# Patient Record
Sex: FEMALE | Race: BLACK OR AFRICAN AMERICAN | NOT HISPANIC OR LATINO | Employment: STUDENT | URBAN - METROPOLITAN AREA
[De-identification: names, ages, dates, MRNs, and addresses within clinical notes are randomized per-mention and may not be internally consistent; named-entity substitution may affect disease eponyms.]

---
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
## 2022-10-21 NOTE — PROGRESS NOTES
[facility] Note    Chief Complaint      Chief Complaint   Patient presents with    Annual Exam       History of Present Illness      Marly Peng is a 22 y.o. female who presents today for       Problem List Items Addressed This Visit    None  Visit Diagnoses       Routine medical exam    -  Primary    Relevant Orders    CBC Auto Differential    Comprehensive Metabolic Panel    Hemoglobin A1C    Lipid Panel    T4, Free    TSH        Patient is a new patient to me.  She wants to establish primary care. She reports feeling well today. She denies any SOB, chest pain, N/V, unintentional weight loss, loss of appetite, fatigue, diarrhea, constipation. She is active daily and remains independent with ADL's. 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 She has not been sexually active yet therefore incole not need a PAP at this time. I have discussed importance of starting PAP/vaginal exams when she becomes sexually active. She verbalizes understanding.  Review of Systems   Constitutional: Negative.    HENT: Negative.     Eyes: Negative.    Respiratory: Negative.     Cardiovascular: Negative.    Gastrointestinal: Negative.    Genitourinary: Negative.    Musculoskeletal: Negative.    Skin: Negative.    Neurological: Negative.    Endo/Heme/Allergies: Negative.    Psychiatric/Behavioral: Negative.     All 12 systems otherwise negative.     Past Medical History:  History reviewed. No pertinent past medical history.    Past Surgical History:  History reviewed. No pertinent surgical history.    Family History:  family history includes Diabetes in her maternal grandmother; Diabetes type II in her mother; Hypertension in her maternal grandmother and mother; No Known Problems in her father.   Family history was reviewed with patient.     Social History:  Social History     Socioeconomic History    Marital status: Single   Tobacco Use    Smoking status: Never    Smokeless tobacco: Never  "  Substance and Sexual Activity    Alcohol use: No   Social History Narrative    Lives with mom and sister (21 years old).             Medications:  No outpatient encounter medications on file as of 11/3/2022.     No facility-administered encounter medications on file as of 11/3/2022.       Allergies:  Review of patient's allergies indicates:   Allergen Reactions    Amoxil [amoxicillin] Hives    Chocolate flavor Hives       Health Maintenance:  Health Maintenance   Topic Date Due    Hepatitis C Screening  Never done    Lipid Panel  Never done    Varicella Vaccines (2 of 2 - 2-dose childhood series) 12/30/2005    HPV Vaccines (1 - 2-dose series) Never done    Pap Smear  Never done    TETANUS VACCINE  02/18/2023    DTaP/Tdap/Td Vaccines (7 - Td or Tdap) 02/18/2023    Hib Vaccines  Completed    Hepatitis B Vaccines  Completed    IPV Vaccines  Completed    Hepatitis A Vaccines  Completed    MMR Vaccines  Completed    Meningococcal Vaccine  Completed     Health Maintenance Topics with due status: Not Due       Topic Last Completion Date    TETANUS VACCINE 02/18/2013    DTaP/Tdap/Td Vaccines 02/18/2013       Physical Exam      Vital Signs  Temp: 97.9 °F (36.6 °C)  Temp src: Oral  Pulse: 100  SpO2: 99 %  BP: 130/76  BP Location: Right arm  Patient Position: Sitting  Pain Score: 0-No pain  Height and Weight  Height: 5' 6" (167.6 cm)  Weight: 78.6 kg (173 lb 4.5 oz)  BSA (Calculated - sq m): 1.91 sq meters  BMI (Calculated): 28  Weight in (lb) to have BMI = 25: 154.6]    Physical Exam  Vitals reviewed.   Constitutional:       Appearance: Normal appearance. She is normal weight.   HENT:      Head: Normocephalic and atraumatic.      Right Ear: Tympanic membrane, ear canal and external ear normal.      Left Ear: Tympanic membrane, ear canal and external ear normal.      Nose: Nose normal.      Mouth/Throat:      Mouth: Mucous membranes are moist.      Pharynx: Oropharynx is clear.   Eyes:      Extraocular Movements: Extraocular " movements intact.      Conjunctiva/sclera: Conjunctivae normal.      Pupils: Pupils are equal, round, and reactive to light.   Cardiovascular:      Rate and Rhythm: Normal rate and regular rhythm.      Pulses: Normal pulses.      Heart sounds: Normal heart sounds.   Pulmonary:      Effort: Pulmonary effort is normal.      Breath sounds: Normal breath sounds.   Abdominal:      General: Abdomen is flat. Bowel sounds are normal.      Palpations: Abdomen is soft.   Musculoskeletal:         General: Normal range of motion.      Cervical back: Normal range of motion and neck supple.   Skin:     General: Skin is warm and dry.      Capillary Refill: Capillary refill takes less than 2 seconds.   Neurological:      General: No focal deficit present.      Mental Status: She is alert and oriented to person, place, and time. Mental status is at baseline.   Psychiatric:         Mood and Affect: Mood normal.         Behavior: Behavior normal.         Thought Content: Thought content normal.         Judgment: Judgment normal.        Laboratory:  CBC:      CMP:        Invalid input(s): CREATININ  URINALYSIS:       LIPIDS:      TSH:      A1C:        Radiology:        Assessment/Plan     Marly Peng is a 22 y.o.female with:    Routine medical exam  -     CBC Auto Differential; Future; Expected date: 11/03/2022  -     Comprehensive Metabolic Panel; Future; Expected date: 11/03/2022  -     Hemoglobin A1C; Future; Expected date: 11/03/2022  -     Lipid Panel; Future; Expected date: 11/03/2022  -     T4, Free; Future; Expected date: 11/03/2022  -     TSH; Future; Expected date: 11/03/2022      As above, continue current medications and maintain follow up with specialists.  Return to clinic as needed.    I spent 20 minutes on the day of this encounter for preparing, evaluating, examining, treating, and discussing plan of care with this patient.  Greater than 50% of this time was spent face to face with patient.  All questions were  answered to patient's satisfaction.           Karen L Spencer, NP-C [facility]

## 2022-11-03 ENCOUNTER — OFFICE VISIT (OUTPATIENT)
Dept: PRIMARY CARE CLINIC | Facility: CLINIC | Age: 22
End: 2022-11-03
Payer: COMMERCIAL

## 2022-11-03 VITALS
HEART RATE: 100 BPM | DIASTOLIC BLOOD PRESSURE: 76 MMHG | SYSTOLIC BLOOD PRESSURE: 130 MMHG | OXYGEN SATURATION: 99 % | BODY MASS INDEX: 27.85 KG/M2 | HEIGHT: 66 IN | WEIGHT: 173.31 LBS | TEMPERATURE: 98 F

## 2022-11-03 DIAGNOSIS — Z00.00 ROUTINE MEDICAL EXAM: Primary | ICD-10-CM

## 2022-11-03 PROCEDURE — 1159F MED LIST DOCD IN RCRD: CPT | Mod: CPTII,S$GLB,, | Performed by: NURSE PRACTITIONER

## 2022-11-03 PROCEDURE — 3078F PR MOST RECENT DIASTOLIC BLOOD PRESSURE < 80 MM HG: ICD-10-PCS | Mod: CPTII,S$GLB,, | Performed by: NURSE PRACTITIONER

## 2022-11-03 PROCEDURE — 99999 PR PBB SHADOW E&M-NEW PATIENT-LVL IV: CPT | Mod: PBBFAC,,, | Performed by: NURSE PRACTITIONER

## 2022-11-03 PROCEDURE — 1159F PR MEDICATION LIST DOCUMENTED IN MEDICAL RECORD: ICD-10-PCS | Mod: CPTII,S$GLB,, | Performed by: NURSE PRACTITIONER

## 2022-11-03 PROCEDURE — 1160F PR REVIEW ALL MEDS BY PRESCRIBER/CLIN PHARMACIST DOCUMENTED: ICD-10-PCS | Mod: CPTII,S$GLB,, | Performed by: NURSE PRACTITIONER

## 2022-11-03 PROCEDURE — 3078F DIAST BP <80 MM HG: CPT | Mod: CPTII,S$GLB,, | Performed by: NURSE PRACTITIONER

## 2022-11-03 PROCEDURE — 1160F RVW MEDS BY RX/DR IN RCRD: CPT | Mod: CPTII,S$GLB,, | Performed by: NURSE PRACTITIONER

## 2022-11-03 PROCEDURE — 3075F SYST BP GE 130 - 139MM HG: CPT | Mod: CPTII,S$GLB,, | Performed by: NURSE PRACTITIONER

## 2022-11-03 PROCEDURE — 3075F PR MOST RECENT SYSTOLIC BLOOD PRESS GE 130-139MM HG: ICD-10-PCS | Mod: CPTII,S$GLB,, | Performed by: NURSE PRACTITIONER

## 2022-11-03 PROCEDURE — 3008F PR BODY MASS INDEX (BMI) DOCUMENTED: ICD-10-PCS | Mod: CPTII,S$GLB,, | Performed by: NURSE PRACTITIONER

## 2022-11-03 PROCEDURE — 99385 PR PREVENTIVE VISIT,NEW,18-39: ICD-10-PCS | Mod: S$GLB,,, | Performed by: NURSE PRACTITIONER

## 2022-11-03 PROCEDURE — 99999 PR PBB SHADOW E&M-NEW PATIENT-LVL IV: ICD-10-PCS | Mod: PBBFAC,,, | Performed by: NURSE PRACTITIONER

## 2022-11-03 PROCEDURE — 3008F BODY MASS INDEX DOCD: CPT | Mod: CPTII,S$GLB,, | Performed by: NURSE PRACTITIONER

## 2022-11-03 PROCEDURE — 99385 PREV VISIT NEW AGE 18-39: CPT | Mod: S$GLB,,, | Performed by: NURSE PRACTITIONER

## 2022-11-08 ENCOUNTER — LAB VISIT (OUTPATIENT)
Dept: LAB | Facility: HOSPITAL | Age: 22
End: 2022-11-08
Attending: NURSE PRACTITIONER
Payer: COMMERCIAL

## 2022-11-08 DIAGNOSIS — Z00.00 ROUTINE MEDICAL EXAM: ICD-10-CM

## 2022-11-08 DIAGNOSIS — D64.9 ANEMIA, UNSPECIFIED TYPE: Primary | ICD-10-CM

## 2022-11-08 LAB
ALBUMIN SERPL BCP-MCNC: 4.2 G/DL (ref 3.5–5.2)
ALP SERPL-CCNC: 40 U/L (ref 55–135)
ALT SERPL W/O P-5'-P-CCNC: 13 U/L (ref 10–44)
ANION GAP SERPL CALC-SCNC: 12 MMOL/L (ref 8–16)
AST SERPL-CCNC: 17 U/L (ref 10–40)
BASOPHILS # BLD AUTO: 0.05 K/UL (ref 0–0.2)
BASOPHILS NFR BLD: 0.9 % (ref 0–1.9)
BILIRUB SERPL-MCNC: 0.4 MG/DL (ref 0.1–1)
BUN SERPL-MCNC: 8 MG/DL (ref 6–20)
CALCIUM SERPL-MCNC: 9.7 MG/DL (ref 8.7–10.5)
CHLORIDE SERPL-SCNC: 105 MMOL/L (ref 95–110)
CHOLEST SERPL-MCNC: 170 MG/DL (ref 120–199)
CHOLEST/HDLC SERPL: 2.4 {RATIO} (ref 2–5)
CO2 SERPL-SCNC: 23 MMOL/L (ref 23–29)
CREAT SERPL-MCNC: 0.9 MG/DL (ref 0.5–1.4)
DIFFERENTIAL METHOD: ABNORMAL
EOSINOPHIL # BLD AUTO: 0.1 K/UL (ref 0–0.5)
EOSINOPHIL NFR BLD: 2 % (ref 0–8)
ERYTHROCYTE [DISTWIDTH] IN BLOOD BY AUTOMATED COUNT: 14 % (ref 11.5–14.5)
EST. GFR  (NO RACE VARIABLE): >60 ML/MIN/1.73 M^2
ESTIMATED AVG GLUCOSE: 108 MG/DL (ref 68–131)
GLUCOSE SERPL-MCNC: 84 MG/DL (ref 70–110)
HBA1C MFR BLD: 5.4 % (ref 4–5.6)
HCT VFR BLD AUTO: 35.2 % (ref 37–48.5)
HDLC SERPL-MCNC: 71 MG/DL (ref 40–75)
HDLC SERPL: 41.8 % (ref 20–50)
HGB BLD-MCNC: 10.3 G/DL (ref 12–16)
IMM GRANULOCYTES # BLD AUTO: 0.01 K/UL (ref 0–0.04)
IMM GRANULOCYTES NFR BLD AUTO: 0.2 % (ref 0–0.5)
LDLC SERPL CALC-MCNC: 88.4 MG/DL (ref 63–159)
LYMPHOCYTES # BLD AUTO: 2 K/UL (ref 1–4.8)
LYMPHOCYTES NFR BLD: 35.8 % (ref 18–48)
MCH RBC QN AUTO: 24.2 PG (ref 27–31)
MCHC RBC AUTO-ENTMCNC: 29.3 G/DL (ref 32–36)
MCV RBC AUTO: 83 FL (ref 82–98)
MONOCYTES # BLD AUTO: 0.5 K/UL (ref 0.3–1)
MONOCYTES NFR BLD: 9.6 % (ref 4–15)
NEUTROPHILS # BLD AUTO: 2.8 K/UL (ref 1.8–7.7)
NEUTROPHILS NFR BLD: 51.5 % (ref 38–73)
NONHDLC SERPL-MCNC: 99 MG/DL
NRBC BLD-RTO: 0 /100 WBC
PLATELET # BLD AUTO: 254 K/UL (ref 150–450)
PMV BLD AUTO: 12.3 FL (ref 9.2–12.9)
POTASSIUM SERPL-SCNC: 3.6 MMOL/L (ref 3.5–5.1)
PROT SERPL-MCNC: 7.4 G/DL (ref 6–8.4)
RBC # BLD AUTO: 4.26 M/UL (ref 4–5.4)
SODIUM SERPL-SCNC: 140 MMOL/L (ref 136–145)
T4 FREE SERPL-MCNC: 0.94 NG/DL (ref 0.71–1.51)
TRIGL SERPL-MCNC: 53 MG/DL (ref 30–150)
TSH SERPL DL<=0.005 MIU/L-ACNC: 1.75 UIU/ML (ref 0.4–4)
WBC # BLD AUTO: 5.51 K/UL (ref 3.9–12.7)

## 2022-11-08 PROCEDURE — 80061 LIPID PANEL: CPT | Performed by: NURSE PRACTITIONER

## 2022-11-08 PROCEDURE — 84439 ASSAY OF FREE THYROXINE: CPT | Performed by: NURSE PRACTITIONER

## 2022-11-08 PROCEDURE — 84443 ASSAY THYROID STIM HORMONE: CPT | Performed by: NURSE PRACTITIONER

## 2022-11-08 PROCEDURE — 80053 COMPREHEN METABOLIC PANEL: CPT | Performed by: NURSE PRACTITIONER

## 2022-11-08 PROCEDURE — 83036 HEMOGLOBIN GLYCOSYLATED A1C: CPT | Performed by: NURSE PRACTITIONER

## 2022-11-08 PROCEDURE — 85025 COMPLETE CBC W/AUTO DIFF WBC: CPT | Performed by: NURSE PRACTITIONER

## 2022-11-08 PROCEDURE — 36415 COLL VENOUS BLD VENIPUNCTURE: CPT | Mod: PN | Performed by: NURSE PRACTITIONER

## 2024-03-18 ENCOUNTER — TELEPHONE (OUTPATIENT)
Dept: PRIMARY CARE CLINIC | Facility: CLINIC | Age: 24
End: 2024-03-18
Payer: COMMERCIAL

## 2024-03-18 NOTE — TELEPHONE ENCOUNTER
----- Message from Miryam Uriarte sent at 3/18/2024  8:38 AM CDT -----  Contact: [phone]  1MEDICALADVICE     Patient is calling for Medical Advice regarding: pt needs to reschedule today's appt couldn't help p[ pt due to visit type please advise      How long has patient had these symptoms:    Pharmacy name and phone#:    Would like response via [employer]: call back     Comments:

## 2024-03-18 NOTE — TELEPHONE ENCOUNTER
Spoke with patient and she wanted to reschedule for appointment for April 3 not today. Today's appointment was canceled.       ----- Message from Miryam Uriarte sent at 3/18/2024  8:38 AM CDT -----  Contact: [phone]  1MEDICALADVICE     Patient is calling for Medical Advice regarding: pt needs to reschedule today's appt couldn't help p[ pt due to visit type please advise      How long has patient had these symptoms:    Pharmacy name and phone#:    Would like response via [employer]: call back     Comments:

## 2024-03-20 ENCOUNTER — PATIENT OUTREACH (OUTPATIENT)
Dept: ADMINISTRATIVE | Facility: HOSPITAL | Age: 24
End: 2024-03-20
Payer: COMMERCIAL

## 2024-03-20 NOTE — PROGRESS NOTES
Health Maintenance Due   Topic Date Due    Hepatitis C Screening  Never done    Varicella Vaccines (2 of 2 - 2-dose childhood series) 12/30/2005    HPV Vaccines (1 - 2-dose series) Never done    HIV Screening  Never done    Pap Smear  Never done    TETANUS VACCINE  02/18/2023    DTaP/Tdap/Td Vaccines (7 - Td or Tdap) 02/18/2023    Influenza Vaccine (1) Never done    COVID-19 Vaccine (2 - 2023-24 season) 09/01/2023        Chart review done.   HM updated.   Immunizations reviewed & updated.   Care Everywhere updated.  [facility] reviewed

## 2024-04-03 NOTE — PROGRESS NOTES
[facility] Note    Chief Complaint      Chief Complaint   Patient presents with    Annual Exam       History of Present Illness      Marly Peng is a 23 y.o. female who presents today for   Chief Complaint   Patient presents with    Annual Exam         Patient is known to me.  She presents to clinic today for her routine medical exam.  She is active daily.  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  Blood pressure today is 142/60.  I have discussed starting medication of hydrochlorothiazide at 12.5 mg p.o. I have advised patient to keep a blood pressure diary with readings done in the a.m. and the p.m. and send readings to me after 1 week in 2 weeks through patient portal.  Patient verbalizes understanding.  There are no complaints today.  She is feeling well overall.           Review of Systems   All 12 systems otherwise negative.       Family History:  family history includes Diabetes in her maternal grandmother; Diabetes type II in her mother; Hypertension in her maternal grandmother and mother; No Known Problems in her brother, father, maternal grandfather, paternal grandfather, paternal grandmother, and sister.   Family history was reviewed with patient.     Medications:  Outpatient Encounter Medications as of 4/5/2024   Medication Sig Dispense Refill    hydroCHLOROthiazide (HYDRODIURIL) 12.5 MG Tab Take 1 tablet (12.5 mg total) by mouth once daily. 30 tablet 11     No facility-administered encounter medications on file as of 4/5/2024.       Allergies:  Review of patient's allergies indicates:   Allergen Reactions    Amoxil [amoxicillin] Hives    Chocolate flavor Hives       Health Maintenance:  Health Maintenance   Topic Date Due    Hepatitis C Screening  Never done    Pap Smear  Never done    Varicella Vaccines (2 of 2 - 2-dose childhood series) 04/05/2024 (Originally 12/30/2005)    TETANUS VACCINE  04/05/2025 (Originally  "2/18/2023)    HPV Vaccines (1 - 2-dose series) 04/05/2025 (Originally 5/30/2012)    Hib Vaccines  Completed    Hepatitis B Vaccines  Completed    IPV Vaccines  Completed    Lipid Panel  Completed    Hepatitis A Vaccines  Completed    MMR Vaccines  Completed    Meningococcal Vaccine  Completed    DTaP/Tdap/Td Vaccines  Discontinued     The patient has no Health Maintenance topics of status Not Due    Physical Exam      Vital Signs  Temp: 99.1 °F (37.3 °C)  Temp Source: Oral  Pulse: 106  SpO2: 99 %  BP: (!) 142/60  BP Location: Right arm  Patient Position: Sitting  Pain Score: 0-No pain  Height and Weight  Height: 5' 6" (167.6 cm)  Weight: 94.7 kg (208 lb 12.4 oz)  BSA (Calculated - sq m): 2.1 sq meters  BMI (Calculated): 33.7  Weight in (lb) to have BMI = 25: 154.6]    Physical Exam  Vitals reviewed.   Constitutional:       Appearance: She is normal weight.   HENT:      Head: Normocephalic and atraumatic.      Right Ear: Tympanic membrane, ear canal and external ear normal.      Left Ear: Tympanic membrane, ear canal and external ear normal.      Nose: Nose normal.      Mouth/Throat:      Mouth: Mucous membranes are moist.      Pharynx: Oropharynx is clear.   Eyes:      Extraocular Movements: Extraocular movements intact.      Conjunctiva/sclera: Conjunctivae normal.      Pupils: Pupils are equal, round, and reactive to light.   Cardiovascular:      Rate and Rhythm: Normal rate and regular rhythm.      Pulses: Normal pulses.      Heart sounds: Normal heart sounds.   Pulmonary:      Effort: Pulmonary effort is normal.      Breath sounds: Normal breath sounds.   Abdominal:      General: Abdomen is flat. Bowel sounds are normal.      Palpations: Abdomen is soft.   Musculoskeletal:         General: Normal range of motion.      Cervical back: Normal range of motion.   Skin:     General: Skin is warm and dry.      Capillary Refill: Capillary refill takes less than 2 seconds.   Neurological:      General: No focal deficit " present.      Mental Status: She is alert and oriented to person, place, and time. Mental status is at baseline.   Psychiatric:         Mood and Affect: Mood normal.         Behavior: Behavior normal.         Thought Content: Thought content normal.         Judgment: Judgment normal.            Assessment/Plan     Marly Peng is a 23 y.o.female with:    Routine medical exam  -     CBC Auto Differential; Future; Expected date: 04/05/2024  -     Comprehensive Metabolic Panel; Future; Expected date: 04/05/2024  -     Hemoglobin A1C; Future; Expected date: 04/05/2024  -     Lipid Panel; Future; Expected date: 04/05/2024  -     T4, Free; Future; Expected date: 04/05/2024  -     TSH; Future; Expected date: 04/05/2024    Encounter for screening for HIV  -     HIV 1/2 Ag/Ab (4th Gen); Future; Expected date: 04/05/2024    Encounter for hepatitis C screening test for low risk patient  -     Hepatitis C Antibody; Future; Expected date: 04/05/2024    Need for HPV vaccine    Need for tetanus booster    Need for influenza vaccination    Need for COVID-19 vaccine    Well woman exam with routine gynecological exam  -     Ambulatory referral/consult to Gynecology; Future; Expected date: 04/12/2024    Need for diphtheria-tetanus-pertussis (Tdap) vaccine    Hypertension, unspecified type  -     hydroCHLOROthiazide (HYDRODIURIL) 12.5 MG Tab; Take 1 tablet (12.5 mg total) by mouth once daily.  Dispense: 30 tablet; Refill: 11    - Patient refused HPV vaccine, dTAP vaccine, influenza vaccine, and COVID booster.  - I have instructed patient to keep a blood pressure diary with readings done in the a.m. and the p.m..  She is to send readings after 1 week in 2 weeks the patient portal for further plan of care.  ======================================================================    As above, continue current medications and maintain follow up with specialists.  Return to clinic as needed.    Greater than 50% of visit was spent face to face  with patient.  All questions were answered to patient's satisfaction.            Julia Chacon, NP-C  [facility]

## 2024-04-05 ENCOUNTER — OFFICE VISIT (OUTPATIENT)
Dept: PRIMARY CARE CLINIC | Facility: CLINIC | Age: 24
End: 2024-04-05
Payer: COMMERCIAL

## 2024-04-05 VITALS
WEIGHT: 208.75 LBS | DIASTOLIC BLOOD PRESSURE: 60 MMHG | HEIGHT: 66 IN | HEART RATE: 106 BPM | TEMPERATURE: 99 F | SYSTOLIC BLOOD PRESSURE: 142 MMHG | OXYGEN SATURATION: 99 % | BODY MASS INDEX: 33.55 KG/M2

## 2024-04-05 DIAGNOSIS — Z01.419 WELL WOMAN EXAM WITH ROUTINE GYNECOLOGICAL EXAM: ICD-10-CM

## 2024-04-05 DIAGNOSIS — I10 HYPERTENSION, UNSPECIFIED TYPE: ICD-10-CM

## 2024-04-05 DIAGNOSIS — Z23 NEED FOR DIPHTHERIA-TETANUS-PERTUSSIS (TDAP) VACCINE: ICD-10-CM

## 2024-04-05 DIAGNOSIS — Z23 NEED FOR HPV VACCINE: ICD-10-CM

## 2024-04-05 DIAGNOSIS — Z11.4 ENCOUNTER FOR SCREENING FOR HIV: ICD-10-CM

## 2024-04-05 DIAGNOSIS — Z23 NEED FOR COVID-19 VACCINE: ICD-10-CM

## 2024-04-05 DIAGNOSIS — Z00.00 ROUTINE MEDICAL EXAM: Primary | ICD-10-CM

## 2024-04-05 DIAGNOSIS — Z23 NEED FOR INFLUENZA VACCINATION: ICD-10-CM

## 2024-04-05 DIAGNOSIS — Z23 NEED FOR TETANUS BOOSTER: ICD-10-CM

## 2024-04-05 DIAGNOSIS — Z11.59 ENCOUNTER FOR HEPATITIS C SCREENING TEST FOR LOW RISK PATIENT: ICD-10-CM

## 2024-04-05 PROCEDURE — 3008F BODY MASS INDEX DOCD: CPT | Mod: CPTII,S$GLB,, | Performed by: NURSE PRACTITIONER

## 2024-04-05 PROCEDURE — 3078F DIAST BP <80 MM HG: CPT | Mod: CPTII,S$GLB,, | Performed by: NURSE PRACTITIONER

## 2024-04-05 PROCEDURE — 99999 PR PBB SHADOW E&M-EST. PATIENT-LVL IV: CPT | Mod: PBBFAC,,, | Performed by: NURSE PRACTITIONER

## 2024-04-05 PROCEDURE — 3077F SYST BP >= 140 MM HG: CPT | Mod: CPTII,S$GLB,, | Performed by: NURSE PRACTITIONER

## 2024-04-05 PROCEDURE — 99395 PREV VISIT EST AGE 18-39: CPT | Mod: S$GLB,,, | Performed by: NURSE PRACTITIONER

## 2024-04-05 PROCEDURE — 1159F MED LIST DOCD IN RCRD: CPT | Mod: CPTII,S$GLB,, | Performed by: NURSE PRACTITIONER

## 2024-04-05 PROCEDURE — 1160F RVW MEDS BY RX/DR IN RCRD: CPT | Mod: CPTII,S$GLB,, | Performed by: NURSE PRACTITIONER

## 2024-04-05 RX ORDER — HYDROCHLOROTHIAZIDE 12.5 MG/1
12.5 TABLET ORAL DAILY
Qty: 30 TABLET | Refills: 11 | Status: SHIPPED | OUTPATIENT
Start: 2024-04-05 | End: 2025-04-05

## 2024-04-12 ENCOUNTER — LAB VISIT (OUTPATIENT)
Dept: LAB | Facility: HOSPITAL | Age: 24
End: 2024-04-12
Payer: COMMERCIAL

## 2024-04-12 DIAGNOSIS — Z00.00 ROUTINE MEDICAL EXAM: ICD-10-CM

## 2024-04-12 DIAGNOSIS — Z11.59 ENCOUNTER FOR HEPATITIS C SCREENING TEST FOR LOW RISK PATIENT: ICD-10-CM

## 2024-04-12 DIAGNOSIS — Z11.4 ENCOUNTER FOR SCREENING FOR HIV: ICD-10-CM

## 2024-04-12 LAB
ALBUMIN SERPL BCP-MCNC: 4 G/DL (ref 3.5–5.2)
ALP SERPL-CCNC: 44 U/L (ref 55–135)
ALT SERPL W/O P-5'-P-CCNC: 20 U/L (ref 10–44)
ANION GAP SERPL CALC-SCNC: 9 MMOL/L (ref 8–16)
AST SERPL-CCNC: 19 U/L (ref 10–40)
BASOPHILS # BLD AUTO: 0.04 K/UL (ref 0–0.2)
BASOPHILS NFR BLD: 0.6 % (ref 0–1.9)
BILIRUB SERPL-MCNC: 0.4 MG/DL (ref 0.1–1)
BUN SERPL-MCNC: 11 MG/DL (ref 6–20)
CALCIUM SERPL-MCNC: 9.7 MG/DL (ref 8.7–10.5)
CHLORIDE SERPL-SCNC: 107 MMOL/L (ref 95–110)
CHOLEST SERPL-MCNC: 184 MG/DL (ref 120–199)
CHOLEST/HDLC SERPL: 2.7 {RATIO} (ref 2–5)
CO2 SERPL-SCNC: 25 MMOL/L (ref 23–29)
CREAT SERPL-MCNC: 0.8 MG/DL (ref 0.5–1.4)
DIFFERENTIAL METHOD BLD: ABNORMAL
EOSINOPHIL # BLD AUTO: 0.1 K/UL (ref 0–0.5)
EOSINOPHIL NFR BLD: 2.1 % (ref 0–8)
ERYTHROCYTE [DISTWIDTH] IN BLOOD BY AUTOMATED COUNT: 15.9 % (ref 11.5–14.5)
EST. GFR  (NO RACE VARIABLE): >60 ML/MIN/1.73 M^2
ESTIMATED AVG GLUCOSE: 105 MG/DL (ref 68–131)
GLUCOSE SERPL-MCNC: 84 MG/DL (ref 70–110)
HBA1C MFR BLD: 5.3 % (ref 4–5.6)
HCT VFR BLD AUTO: 32.9 % (ref 37–48.5)
HCV AB SERPL QL IA: NORMAL
HDLC SERPL-MCNC: 69 MG/DL (ref 40–75)
HDLC SERPL: 37.5 % (ref 20–50)
HGB BLD-MCNC: 9.3 G/DL (ref 12–16)
HIV 1+2 AB+HIV1 P24 AG SERPL QL IA: NORMAL
IMM GRANULOCYTES # BLD AUTO: 0.02 K/UL (ref 0–0.04)
IMM GRANULOCYTES NFR BLD AUTO: 0.3 % (ref 0–0.5)
LDLC SERPL CALC-MCNC: 104.4 MG/DL (ref 63–159)
LYMPHOCYTES # BLD AUTO: 1.3 K/UL (ref 1–4.8)
LYMPHOCYTES NFR BLD: 20.2 % (ref 18–48)
MCH RBC QN AUTO: 21.8 PG (ref 27–31)
MCHC RBC AUTO-ENTMCNC: 28.3 G/DL (ref 32–36)
MCV RBC AUTO: 77 FL (ref 82–98)
MONOCYTES # BLD AUTO: 0.6 K/UL (ref 0.3–1)
MONOCYTES NFR BLD: 9.5 % (ref 4–15)
NEUTROPHILS # BLD AUTO: 4.3 K/UL (ref 1.8–7.7)
NEUTROPHILS NFR BLD: 67.3 % (ref 38–73)
NONHDLC SERPL-MCNC: 115 MG/DL
NRBC BLD-RTO: 0 /100 WBC
PLATELET # BLD AUTO: 245 K/UL (ref 150–450)
PMV BLD AUTO: 11.4 FL (ref 9.2–12.9)
POTASSIUM SERPL-SCNC: 3.9 MMOL/L (ref 3.5–5.1)
PROT SERPL-MCNC: 7.6 G/DL (ref 6–8.4)
RBC # BLD AUTO: 4.27 M/UL (ref 4–5.4)
SODIUM SERPL-SCNC: 141 MMOL/L (ref 136–145)
T4 FREE SERPL-MCNC: 0.98 NG/DL (ref 0.71–1.51)
TRIGL SERPL-MCNC: 53 MG/DL (ref 30–150)
TSH SERPL DL<=0.005 MIU/L-ACNC: 1.17 UIU/ML (ref 0.4–4)
WBC # BLD AUTO: 6.33 K/UL (ref 3.9–12.7)

## 2024-04-12 PROCEDURE — 86803 HEPATITIS C AB TEST: CPT | Performed by: NURSE PRACTITIONER

## 2024-04-12 PROCEDURE — 84443 ASSAY THYROID STIM HORMONE: CPT | Performed by: NURSE PRACTITIONER

## 2024-04-12 PROCEDURE — 85025 COMPLETE CBC W/AUTO DIFF WBC: CPT | Performed by: NURSE PRACTITIONER

## 2024-04-12 PROCEDURE — 87389 HIV-1 AG W/HIV-1&-2 AB AG IA: CPT | Performed by: NURSE PRACTITIONER

## 2024-04-12 PROCEDURE — 80053 COMPREHEN METABOLIC PANEL: CPT | Performed by: NURSE PRACTITIONER

## 2024-04-12 PROCEDURE — 83036 HEMOGLOBIN GLYCOSYLATED A1C: CPT | Performed by: NURSE PRACTITIONER

## 2024-04-12 PROCEDURE — 80061 LIPID PANEL: CPT | Performed by: NURSE PRACTITIONER

## 2024-04-12 PROCEDURE — 36415 COLL VENOUS BLD VENIPUNCTURE: CPT | Mod: PN | Performed by: NURSE PRACTITIONER

## 2024-04-12 PROCEDURE — 84439 ASSAY OF FREE THYROXINE: CPT | Performed by: NURSE PRACTITIONER

## 2024-04-15 DIAGNOSIS — D64.9 ANEMIA, UNSPECIFIED TYPE: Primary | ICD-10-CM

## 2024-05-24 ENCOUNTER — TELEPHONE (OUTPATIENT)
Dept: OBSTETRICS AND GYNECOLOGY | Facility: CLINIC | Age: 24
End: 2024-05-24
Payer: COMMERCIAL

## 2024-11-04 ENCOUNTER — HOSPITAL ENCOUNTER (EMERGENCY)
Facility: OTHER | Age: 24
Discharge: HOME | End: 2024-11-04
Attending: EMERGENCY MEDICINE
Payer: COMMERCIAL

## 2024-11-04 VITALS
TEMPERATURE: 98 F | OXYGEN SATURATION: 100 % | DIASTOLIC BLOOD PRESSURE: 80 MMHG | WEIGHT: 201 LBS | HEART RATE: 78 BPM | HEIGHT: 66 IN | BODY MASS INDEX: 32.3 KG/M2 | SYSTOLIC BLOOD PRESSURE: 129 MMHG | RESPIRATION RATE: 17 BRPM

## 2024-11-04 DIAGNOSIS — J06.9 VIRAL URI WITH COUGH: Primary | ICD-10-CM

## 2024-11-04 LAB
B-HCG UR QL: NEGATIVE
CTP QC/QA: YES
GROUP A STREP, MOLECULAR: NEGATIVE
POC MOLECULAR INFLUENZA A AGN: NEGATIVE
POC MOLECULAR INFLUENZA B AGN: NEGATIVE
SARS-COV-2 RDRP RESP QL NAA+PROBE: NEGATIVE

## 2024-11-04 PROCEDURE — 99283 EMERGENCY DEPT VISIT LOW MDM: CPT

## 2024-11-04 PROCEDURE — 81025 URINE PREGNANCY TEST: CPT | Performed by: PHYSICIAN ASSISTANT

## 2024-11-04 PROCEDURE — 87635 SARS-COV-2 COVID-19 AMP PRB: CPT | Performed by: PHYSICIAN ASSISTANT

## 2024-11-04 PROCEDURE — 87651 STREP A DNA AMP PROBE: CPT | Performed by: PHYSICIAN ASSISTANT

## 2024-11-04 RX ORDER — GUAIFENESIN AND DEXTROMETHORPHAN HYDROBROMIDE 1200; 60 MG/1; MG/1
1 TABLET, EXTENDED RELEASE ORAL 2 TIMES DAILY PRN
Qty: 30 TABLET | Refills: 0 | Status: SHIPPED | OUTPATIENT
Start: 2024-11-04 | End: 2024-11-14

## 2024-11-04 RX ORDER — IBUPROFEN 600 MG/1
600 TABLET ORAL EVERY 6 HOURS PRN
Qty: 20 TABLET | Refills: 0 | Status: SHIPPED | OUTPATIENT
Start: 2024-11-04

## 2024-11-04 RX ORDER — FLUTICASONE PROPIONATE 50 MCG
1 SPRAY, SUSPENSION (ML) NASAL 2 TIMES DAILY PRN
Qty: 15 G | Refills: 0 | Status: SHIPPED | OUTPATIENT
Start: 2024-11-04

## 2024-11-04 RX ORDER — CETIRIZINE HYDROCHLORIDE 10 MG/1
10 TABLET ORAL DAILY
Qty: 30 TABLET | Refills: 0 | Status: SHIPPED | OUTPATIENT
Start: 2024-11-04 | End: 2025-11-04

## 2024-11-04 NOTE — FIRST PROVIDER EVALUATION
"Medical screening examination initiated.  I have conducted a focused provider triage encounter, findings are as follows:    Brief history of present illness:  uri symptoms that began on thursday    Vitals:    11/04/24 1006   BP: (!) 148/72   BP Location: Left arm   Pulse: 90   Resp: 17   Temp: 97.8 °F (36.6 °C)   TempSrc: Oral   SpO2: 100%   Weight: 91.2 kg (201 lb)   Height: 5' 6" (1.676 m)       Pertinent physical exam:  well appearing in no distress    Brief workup plan:  swabs and upt    Preliminary workup initiated; this workup will be continued and followed by the physician or advanced practice provider that is assigned to the patient when roomed.  "

## 2024-11-04 NOTE — ED PROVIDER NOTES
CHIEF COMPLAINT:   Chief Complaint   Patient presents with    General Illness     Pt reporting nasal congestion, runny nose, and subjective fever x 4 days. Denies N/V/D.        History provided by:  Patient and medical chart    HISTORY OF PRESENT ILLNESS: Marly Peng who is a 24 y.o. presents to the emergency department today with complaint of general illness symptoms.  Patient reports symptoms began 4 days ago.  Does report she was around young children with similar symptoms.  Complains of nasal congestion, runny nose, subjective fever and sore throat.  She denies any productive cough, vomiting, diarrhea or rash.  She has tried over-the-counter agents with modest improvement symptoms.    REVIEW OF SYSTEMS:  As per HPI    ALLERGIES REVIEWED  MEDICATIONS REVIEWED  PMH/PSH/SOC/FH REVIEWED     The history is provided by the patient.    Nursing/Ancillary staff note reviewed.        PHYSICAL EXAM:  VS reviewed  Vitals:    11/04/24 1223   BP: 129/80   Pulse: 78   Resp: 17   Temp: 97.7 °F (36.5 °C)       General Appearance: The patient is alert, has no immediate or signs of toxicity. No acute distress.    HEENT: Eyes:  With no injection, No drainage.  Slight erythema.  No exudate.  Neck:Neck is with No stridor.   Respiratory: There are no retractions.   Cardiovascular: Regular rate   Gastrointestinal:  Abdomen is without distention.   Neurological: Alert and oriented x 4. No focal weakness.  Skin: Warm and dry, no rashes.  Musculoskeletal: Extremities are non-swollen and have full range of motion.      History reviewed. No pertinent past medical history.      History reviewed. No pertinent surgical history.      ED COURSE:     Results for orders placed or performed during the hospital encounter of 11/04/24   Group A Strep, Molecular    Collection Time: 11/04/24 10:46 AM    Specimen: Throat   Result Value Ref Range    Group A Strep, Molecular Negative Negative   POCT COVID-19 Rapid Screening    Collection Time: 11/04/24  11:06 AM   Result Value Ref Range    POC Rapid COVID Negative Negative     Acceptable Yes    POCT Influenza A/B Molecular    Collection Time: 11/04/24 11:06 AM   Result Value Ref Range    POC Molecular Influenza A Ag Negative Negative    POC Molecular Influenza B Ag Negative Negative     Acceptable Yes    POCT urine pregnancy    Collection Time: 11/04/24 11:21 AM   Result Value Ref Range    POC Preg Test, Ur Negative Negative     Acceptable Yes      Imaging Results    None         Patient presenting with general illness symptoms; appears well and nontoxic. Exam grossly unremarkable at this time.    DIFFERENTIAL DIAGNOSIS: After history and physical exam a differential diagnosis was considered, but was not limited to,   Sepsis, meningitis, otitis media/external, nasal polyp, bacterial sinusitis, allergic rhinitis, influenza, COVID19, bacterial/viral pharyngitis, bacterial/viral pneumonia.    ED management: Patient seen for a viral-like illness, therefore due to the most recent recommendations from our hospital administrations/infectious disease at this time, the patient will be swabbed for COVID 19. Rapid COVID is negative, influenza and strep negative.  Instructed patient on symptomatic treatment and increase oral hydration. Vital signs did not indicate sepsis and patient was welling appearing, okay for discharge home.      IMPRESSION  The encounter diagnosis was Viral URI with cough. Strict instructions to follow up with primary care physician or reference provided for further assessment and evaluation. Given instructions to return for any acute symptoms and verbalized understanding of this medical plan.           Soheila Win PA  11/05/24 1732

## 2024-11-04 NOTE — Clinical Note
"Marly "Marly" Danish was seen and treated in our emergency department on 11/4/2024.  She may return to school on 11/06/2024.      If you have any questions or concerns, please don't hesitate to call.      Soheila Win PA"

## 2024-11-04 NOTE — Clinical Note
"Marly "Marly" Danish was seen and treated in our emergency department on 11/4/2024.  She may return to work on 11/06/2024.       If you have any questions or concerns, please don't hesitate to call.      Soheila Win PA"

## 2024-12-29 ENCOUNTER — HOSPITAL ENCOUNTER (EMERGENCY)
Facility: OTHER | Age: 24
Discharge: HOME | End: 2024-12-29
Attending: EMERGENCY MEDICINE
Payer: COMMERCIAL

## 2024-12-29 VITALS
HEIGHT: 66 IN | RESPIRATION RATE: 16 BRPM | SYSTOLIC BLOOD PRESSURE: 148 MMHG | TEMPERATURE: 98 F | DIASTOLIC BLOOD PRESSURE: 71 MMHG | HEART RATE: 92 BPM | BODY MASS INDEX: 28.93 KG/M2 | OXYGEN SATURATION: 100 % | WEIGHT: 180 LBS

## 2024-12-29 DIAGNOSIS — K13.0 SORE OF LOWER LIP: Primary | ICD-10-CM

## 2024-12-29 PROCEDURE — 99282 EMERGENCY DEPT VISIT SF MDM: CPT

## 2024-12-29 RX ORDER — DOCOSANOL 100 MG/G
1 CREAM TOPICAL 2 TIMES DAILY
Qty: 2 G | Refills: 0 | Status: SHIPPED | OUTPATIENT
Start: 2024-12-29

## 2024-12-29 NOTE — DISCHARGE INSTRUCTIONS
Mrs. Peng,    Thank you for letting me care for you today! It was nice meeting you, and I hope you feel better soon.   If you would like access to your chart and what was done today please utilize the [facility].   Please come back to [facility] for all of your future medical needs.    Our goal in the emergency department is to always give you outstanding care and exceptional service. You may receive a survey by mail or e-mail in the next week regarding your experience in our ED. We would greatly appreciate you completing and returning the survey. Your feedback provides us with a way to recognize our staff who give very good care and it helps us learn how to improve when your experience was below our aspiration of excellence.     Sincerely,    Malcolm Parks MD  Board Certified Emergency Physician

## 2024-12-29 NOTE — ED PROVIDER NOTES
Encounter Date: 12/29/2024       History     Chief Complaint   Patient presents with    Abscess     To lower lip x 3 days.      This is a pleasant healthy 24-year-old female presenting for evaluation of an irritation in the left lower lip which prompted her to leave work today.  Notes that it was more swollen before but she had taken something to help with it and thinks that the swelling has gone down some.  She denies any injuries elsewhere, denies any similar episodes previously, nothing in particular makes it any better or worse.    The history is provided by the patient and medical records.     Review of patient's allergies indicates:   Allergen Reactions    Amoxil [amoxicillin] Hives    Chocolate flavor Hives     History reviewed. No pertinent past medical history.  History reviewed. No pertinent surgical history.  Family History   Problem Relation Name Age of Onset    Diabetes type II Mother paulette reyna     Hypertension Mother paulette reyna     No Known Problems Father philip raines     No Known Problems Sister carlos manuel chavira     No Known Problems Brother Danny     Hypertension Maternal Grandmother Anastasiya     Diabetes Maternal Grandmother Anastasiya     No Known Problems Maternal Grandfather Robbin     No Known Problems Paternal Grandmother Azucena     No Known Problems Paternal Grandfather Philip     Early death Neg Hx       Social History     Tobacco Use    Smoking status: Never    Smokeless tobacco: Never   Substance Use Topics    Alcohol use: No     Review of Systems  Constitutional-no fever  HEENT-minor swelling in the left lower lobe  Eyes-no redness  Respiratory-no shortness of breath  Cardio-no chest pain  GI-no abdominal pain  Endocrine-no cold intolerance  -no difficulty urinating  MSK-no myalgias  Skin-no rashes  Allergy-no environmental allergy  Neurologic-, no headache  Hematology-no swollen nodes  Behavioral-no confusion  Physical Exam     Initial Vitals [12/29/24 1458]   BP Pulse Resp Temp SpO2    (!) 152/72 104 16 98.2 °F (36.8 °C) 100 %      MAP       --         Physical Exam    HENT: Mouth/Throat:             Constitutional: Well appearing, no distress.  Eyes: Conjunctivae normal.  ENT       Head: Normocephalic, atraumatic.       Nose: Normal external appearance        Mouth/Throat: no strigulous respirations   Hematological/Lymphatic/Immunilogical: no visible lymphadenopathy   Cardiovascular: Normal rate,   Respiratory: Normal respiratory effort.   Gastrointestinal: non distended   Musculoskeletal: Normal range of motion in all extremities. No obvious deformities or swelling.  Neurologic: Alert, oriented. Normal speech and language. No gross focal neurologic deficits are appreciated.  Skin: Skin is warm, dry. No rash noted.  Psychiatric: Mood and affect are normal.   ED Course   Procedures  Labs Reviewed - No data to display       Imaging Results    None          Medications - No data to display  Medical Decision Making  Differential diagnosis-cold sore, abscess, allergic reaction   Small sore may be herpetic in nature, no clear evidence of such.    Tolerating orals well-appearing otherwise, will plan for discharge symptomatic returning case of any worsening.    Problems Addressed:  Sore of lower lip: acute illness or injury    Amount and/or Complexity of Data Reviewed  External Data Reviewed: labs and notes.     Details: History of previous evaluations for URI and anemia    Risk  OTC drugs.  Prescription drug management.                                      Clinical Impression:  Final diagnoses:  [K13.0] Sore of lower lip (Primary)          ED Disposition Condition    Discharge Stable          ED Prescriptions       Medication Sig Dispense Start Date End Date Auth. Provider    docosanoL (ABREVA) 10 % Crea Apply 1 Application topically 2 (two) times a day. 2 g 12/29/2024 -- Malcolm Parks MD          Follow-up Information       Follow up With Specialties Details Why Contact Info    [facility] -  Emergency Dept Emergency Medicine Go to  As needed, For a follow up visit about today [id number] [facility] [id number] [phone]             Malcolm Parks MD  12/30/24 5138

## 2024-12-29 NOTE — ED NOTES
"Bump to lower lip x 3 days. Pt reports " it feels better I just wanted to come get it checked out". No swelling noted. Pt denies fever  "

## 2024-12-29 NOTE — Clinical Note
"Marly "Marly"Danish was seen and treated in our emergency department on 12/29/2024.  She may return to work on 12/30/2024.       If you have any questions or concerns, please don't hesitate to call.      Malcolm Parks MD"

## 2024-12-30 ENCOUNTER — HOSPITAL ENCOUNTER (EMERGENCY)
Facility: OTHER | Age: 24
Discharge: HOME | End: 2024-12-30
Attending: EMERGENCY MEDICINE
Payer: COMMERCIAL

## 2024-12-30 VITALS
OXYGEN SATURATION: 100 % | RESPIRATION RATE: 18 BRPM | TEMPERATURE: 99 F | SYSTOLIC BLOOD PRESSURE: 128 MMHG | HEART RATE: 77 BPM | BODY MASS INDEX: 29.86 KG/M2 | WEIGHT: 185 LBS | DIASTOLIC BLOOD PRESSURE: 74 MMHG

## 2024-12-30 DIAGNOSIS — N30.00 ACUTE CYSTITIS WITHOUT HEMATURIA: Primary | ICD-10-CM

## 2024-12-30 LAB
B-HCG UR QL: NEGATIVE
BACTERIA #/AREA URNS HPF: ABNORMAL /HPF
BILIRUB UR QL STRIP: NEGATIVE
CLARITY UR: ABNORMAL
COLOR UR: YELLOW
CTP QC/QA: YES
GLUCOSE UR QL STRIP: NEGATIVE
HGB UR QL STRIP: NEGATIVE
HYALINE CASTS #/AREA URNS LPF: 0 /LPF
KETONES UR QL STRIP: ABNORMAL
LEUKOCYTE ESTERASE UR QL STRIP: ABNORMAL
MICROSCOPIC COMMENT: ABNORMAL
NITRITE UR QL STRIP: NEGATIVE
PH UR STRIP: 6 [PH] (ref 5–8)
POC MOLECULAR INFLUENZA A AGN: NEGATIVE
POC MOLECULAR INFLUENZA B AGN: NEGATIVE
PROT UR QL STRIP: ABNORMAL
RBC #/AREA URNS HPF: 1 /HPF (ref 0–4)
SARS-COV-2 RDRP RESP QL NAA+PROBE: NEGATIVE
SP GR UR STRIP: 1.02 (ref 1–1.03)
SQUAMOUS #/AREA URNS HPF: 18 /HPF
URN SPEC COLLECT METH UR: ABNORMAL
UROBILINOGEN UR STRIP-ACNC: NEGATIVE EU/DL
WBC #/AREA URNS HPF: 15 /HPF (ref 0–5)

## 2024-12-30 PROCEDURE — 81000 URINALYSIS NONAUTO W/SCOPE: CPT | Performed by: NURSE PRACTITIONER

## 2024-12-30 PROCEDURE — 87635 SARS-COV-2 COVID-19 AMP PRB: CPT | Performed by: NURSE PRACTITIONER

## 2024-12-30 PROCEDURE — 87086 URINE CULTURE/COLONY COUNT: CPT | Performed by: NURSE PRACTITIONER

## 2024-12-30 PROCEDURE — 81025 URINE PREGNANCY TEST: CPT | Performed by: NURSE PRACTITIONER

## 2024-12-30 PROCEDURE — 99283 EMERGENCY DEPT VISIT LOW MDM: CPT

## 2024-12-30 PROCEDURE — 25000003 PHARM REV CODE 250: Performed by: NURSE PRACTITIONER

## 2024-12-30 RX ORDER — NITROFURANTOIN 25; 75 MG/1; MG/1
100 CAPSULE ORAL
Status: COMPLETED | OUTPATIENT
Start: 2024-12-30 | End: 2024-12-30

## 2024-12-30 RX ORDER — ACETAMINOPHEN 500 MG
1000 TABLET ORAL
Status: COMPLETED | OUTPATIENT
Start: 2024-12-30 | End: 2024-12-30

## 2024-12-30 RX ORDER — NITROFURANTOIN 25; 75 MG/1; MG/1
100 CAPSULE ORAL 2 TIMES DAILY
Qty: 10 CAPSULE | Refills: 0 | Status: SHIPPED | OUTPATIENT
Start: 2024-12-30 | End: 2025-01-04

## 2024-12-30 RX ADMIN — NITROFURANTOIN MONOHYDRATE/MACROCRYSTALS 100 MG: 25; 75 CAPSULE ORAL at 04:12

## 2024-12-30 RX ADMIN — ACETAMINOPHEN 1000 MG: 500 TABLET, FILM COATED ORAL at 02:12

## 2024-12-30 NOTE — ED PROVIDER NOTES
Source of History:  Patient     Chief complaint:  Fatigue (Pt states that she was seen for a cold sore yesterday, but today she is feeling very fatigued and run down, pt stats her mother told her she has a fever)      HPI:  Marly Peng is a 24 y.o. female presenting with complaint of fatigue, malaise and fever that began this morning.  Seen here yesterday for cold sore.  She denies any cough/congestion.  Denies any nausea vomiting or diarrhea.  Denies any vaginal discharge or bleeding or any dysuria.    This is the extent to the patients complaints today here in the emergency department.    PMH:  As per HPI and below:  No past medical history on file.  No past surgical history on file.    Social History     Tobacco Use    Smoking status: Never    Smokeless tobacco: Never   Substance Use Topics    Alcohol use: No     Review of patient's allergies indicates:   Allergen Reactions    Amoxil [amoxicillin] Hives    Chocolate flavor Hives       ROS: As per HPI and below:  General: No fever.  No chills.  Fatigue/malaise  ENT: No sore throat. No ear pain  Chest: No shortness of breath. No cough.    Cardiovascular: No chest pain.   Abdomen:  No abdominal pain  Genito-Urinary:  No dysuria  Neurologic: No focal weakness.  No numbness.  No headache  MSK: no back pain.   Integument: No rashes or lesions.    Physical Exam:    /74 (BP Location: Left arm, Patient Position: Sitting)   Pulse 77   Temp 98.5 °F (36.9 °C) (Oral)   Resp 18   Wt 83.9 kg (185 lb)   LMP 12/19/2024   SpO2 100%   BMI 29.86 kg/m²   Vitals:    12/30/24 1413 12/30/24 1633   BP: 139/83 128/74   Pulse: 74 77   Resp: 20 18   Temp: 98.1 °F (36.7 °C) 98.5 °F (36.9 °C)   TempSrc: Oral Oral   SpO2: 100% 100%   Weight: 83.9 kg (185 lb)        Nursing note and vital signs reviewed.  Appearance: No acute distress.  Eyes: No conjunctival injection.  Extraocular muscles are intact.  Chest/ Respiratory: Clear to auscultation bilaterally.  Good air movement.  No  wheezes.  No rhonchi. No rales. No accessory muscle use.  Cardiovascular: Regular rate and rhythm.  No murmurs. No gallops. No rubs.  Abdomen: Soft.  Nontender to palpation bowel sounds normal, no distention or guarding  Musculoskeletal:  Good range of motion all joints.  No deformities.  Neck supple.  No meningismus.  Skin: No rashes seen.  Good turgor.  No abrasions.  No ecchymoses.  Neuro: alert and oriented x3,  no focal neurological deficits.  Psych: Appropriate, conversant.      Abnormal Labs Reviewed   URINALYSIS, REFLEX TO URINE CULTURE - Abnormal; Notable for the following components:       Result Value    Appearance, UA Hazy (*)     Protein, UA 1+ (*)     Ketones, UA Trace (*)     Leukocytes, UA 3+ (*)     All other components within normal limits    Narrative:     Specimen Source->Urine   URINALYSIS MICROSCOPIC - Abnormal; Notable for the following components:    WBC, UA 15 (*)     All other components within normal limits    Narrative:     Specimen Source->Urine       No orders to display         Initial Impression/ Differential Dx:  Differential Diagnosis includes, but is not limited to:  UTI/pyelonephritis, nephrolithiasis, back spasms, sciatica, appendicitis, ovarian cysts        Medical Decision Making  This is a 24 y.o. female who I believe has a UTI.    Urinalysis shows leukocytes and white blood cells.  Based upon the history and physical exam the patient does not appear to have renal colic, pyelonephritis or sepsis.  At this time, I believe that no further workup/treatment is needed and the patient is stable for discharge.    Plan for discharge home with oral antibiotics, 1st dose provided in the ED..     Results, clinical impression and rx discussed with patient.    Pt is to call for follow up with PCP in 7 days or return to the ED for any concerns.  Return precautions given. Pt expressed understanding.       Amount and/or Complexity of Data Reviewed  Labs: ordered. Decision-making details  documented in ED Course.    Risk  OTC drugs.  Prescription drug management.        ED Course as of 12/30/24 2220   Mon Dec 30, 2024   1614 Leukocyte Esterase, UA(!): 3+ [AS]   1614 NITRITE UA: Negative [AS]   1614 WBC, UA(!): 15 [AS]      ED Course User Index  [AS] Daksha Carballo FNP       Diagnostic Impression:    1. Acute cystitis without hematuria         ED Disposition Condition    Discharge Stable            ED Prescriptions       Medication Sig Dispense Start Date End Date Auth. Provider    nitrofurantoin, macrocrystal-monohydrate, (MACROBID) 100 MG capsule Take 1 capsule (100 mg total) by mouth 2 (two) times daily. for 5 days 10 capsule 12/30/2024 1/4/2025 Daksha Carballo FNP          Follow-up Information       Follow up With Specialties Details Why Contact Info    [facility] - [facility] Dept Emergency Medicine Go to  If symptoms worsen [id number] [facility] [id number] [phone]               Daksha Carballo FNP  12/30/24 2220

## 2024-12-31 LAB
BACTERIA UR CULT: NORMAL
BACTERIA UR CULT: NORMAL

## 2025-02-17 ENCOUNTER — TELEPHONE (OUTPATIENT)
Dept: SURGERY | Facility: CLINIC | Age: 25
End: 2025-02-17
Payer: COMMERCIAL

## 2025-02-21 ENCOUNTER — OFFICE VISIT (OUTPATIENT)
Dept: DERMATOLOGY | Facility: CLINIC | Age: 25
End: 2025-02-21
Payer: COMMERCIAL

## 2025-02-21 DIAGNOSIS — K13.0 LIP LESION: Primary | ICD-10-CM

## 2025-02-21 PROCEDURE — 99999 PR PBB SHADOW E&M-EST. PATIENT-LVL III: CPT | Mod: PBBFAC,,, | Performed by: STUDENT IN AN ORGANIZED HEALTH CARE EDUCATION/TRAINING PROGRAM

## 2025-02-21 NOTE — PROGRESS NOTES
Subjective:      Patient ID:  Marly Peng is a 24 y.o. 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 She popped it and something came out of it. It has not recurred, but she has a strange sensation that has recurred but no skin lesion    Review of Systems   Skin:  Positive for rash.       Objective:   Physical Exam   Constitutional: She appears well-developed and well-nourished. No distress.   Neurological: She is alert and oriented to person, place, and time. She is not disoriented.   Psychiatric: She has a normal mood and affect.   Skin:   Areas Examined (abnormalities noted in diagram):   Head / Face Inspection Performed            Diagram Legend     Erythematous scaling macule/papule c/w actinic keratosis       Vascular papule c/w angioma      Pigmented verrucoid papule/plaque c/w seborrheic keratosis      Yellow umbilicated papule c/w sebaceous hyperplasia      Irregularly shaped tan macule c/w lentigo     1-2 mm smooth white papules consistent with Milia      Movable subcutaneous cyst with punctum c/w epidermal inclusion cyst      Subcutaneous movable cyst c/w pilar cyst      Firm pink to brown papule c/w dermatofibroma      Pedunculated fleshy papule(s) c/w skin tag(s)      Evenly pigmented macule c/w junctional nevus     Mildly variegated pigmented, slightly irregular-bordered macule c/w mildly atypical nevus      Flesh colored to evenly pigmented papule c/w intradermal nevus       Pink pearly papule/plaque c/w basal cell carcinoma      Erythematous hyperkeratotic cursted plaque c/w SCC      Surgical scar with no sign of skin cancer recurrence      Open and closed comedones      Inflammatory papules and pustules      Verrucoid papule consistent consistent with  wart     Erythematous eczematous patches and plaques     Dystrophic onycholytic nail with subungual debris c/w onychomycosis     Umbilicated papule    Erythematous-base heme-crusted tan verrucoid plaque consistent with inflamed seborrheic keratosis     Erythematous Silvery Scaling Plaque c/w Psoriasis     See annotation      Assessment / Plan:        Lip lesion    H/o lesion on lip that was like a pimple that resolved with abbreva. Another episode with photographs showing a few circular superficial erosions on upper lip. Possibly HSV but did not last as long as typical. Offered prn valtrex but she is currently happy with abbreva. She was more concerned about diagnosis. Counseled that diagnosis would require swab from an active lesion sent for PCR. Can go to urgent care when active.  C/w abbreva                Follow up if symptoms worsen or fail to improve.

## 2025-02-21 NOTE — PATIENT INSTRUCTIONS
XEROSIS (DRY SKIN)        Definition    Xerosis is the term for dry skin.  We all have a natural oil coating over our skin produced by the skin oil glands.  If this oil is removed, the skin becomes dry which can lead to cracking, which can lead to inflammation.  Xerosis is usually a long-term problem that recurs often, especially in the winter.    Cause    Long hot baths or showers can remove our natural oil and lead to xerosis.  One should never take more than one bath or shower a day and for no longer than ten minutes.  Use of harsh soaps such as Zest, Dial, and Ivory can worsen and cause xerosis.  Cold winter weather worsens xerosis because the amount of moisture contained in cold air is much less than the amount of moisture in warm air.    Treatment    Treatment is intended to restore the natural oil to your skin.  Keep the skin lubricated.    Do not take more than one bath or shower a day.  Use lukewarm water, not hot.  Hot water dries out the skin.    Use a gentle moisturizing soap such as Cetaphil soap, Oil of Olay, Dove, Basis, Ivory moisture care, Restoraderm cleanser.    When toweling dry, dont rub.  Blot the skin so there is still some water left on the skin.  You should apply a moisturizing cream to all of the skin such as Cerave cream, Cetaphil cream, Lipikar [place] AP+ Intense Repair Moisturizing Cream or Restoraderm or Eucerin Original Formula cream.   Alpha hydroxyacid lotions, i.e., AmLactin, also work very well for preventing dry skin, but may burn when used on inflamed or reddened skin.    If you like to swim during the winter months, you should not use soap when getting out of the pool.  When you have finished swimming, rinse off the chlorine with cool to warm water.  If this will be the only shower of the day, then you may use Cetaphil or another mild soap to cleanse your skin.  After the shower, apply a moisturizing cream to all of the skin as above.        [address] [facility] [place] [address]/ [phone] FAX/ [email or web address]

## 2025-04-07 ENCOUNTER — LAB VISIT (OUTPATIENT)
Dept: LAB | Facility: HOSPITAL | Age: 25
End: 2025-04-07
Attending: NURSE PRACTITIONER
Payer: COMMERCIAL

## 2025-04-07 ENCOUNTER — OFFICE VISIT (OUTPATIENT)
Dept: PRIMARY CARE CLINIC | Facility: CLINIC | Age: 25
End: 2025-04-07
Payer: COMMERCIAL

## 2025-04-07 VITALS
HEIGHT: 66 IN | BODY MASS INDEX: 32.73 KG/M2 | HEART RATE: 91 BPM | SYSTOLIC BLOOD PRESSURE: 132 MMHG | OXYGEN SATURATION: 100 % | DIASTOLIC BLOOD PRESSURE: 62 MMHG | WEIGHT: 203.69 LBS

## 2025-04-07 DIAGNOSIS — D64.9 ANEMIA, UNSPECIFIED TYPE: ICD-10-CM

## 2025-04-07 DIAGNOSIS — R01.1 MURMUR, CARDIAC: ICD-10-CM

## 2025-04-07 DIAGNOSIS — J30.2 SEASONAL ALLERGIES: ICD-10-CM

## 2025-04-07 DIAGNOSIS — Z00.00 ROUTINE MEDICAL EXAM: Primary | ICD-10-CM

## 2025-04-07 LAB
FERRITIN SERPL-MCNC: 6 NG/ML (ref 20–300)
IRON SATN MFR SERPL: 4 % (ref 20–50)
IRON SERPL-MCNC: 23 UG/DL (ref 30–160)
TIBC SERPL-MCNC: 539 UG/DL (ref 250–450)
TRANSFERRIN SERPL-MCNC: 364 MG/DL (ref 200–375)

## 2025-04-07 PROCEDURE — 3075F SYST BP GE 130 - 139MM HG: CPT | Mod: CPTII,S$GLB,, | Performed by: NURSE PRACTITIONER

## 2025-04-07 PROCEDURE — 99999 PR PBB SHADOW E&M-EST. PATIENT-LVL III: CPT | Mod: PBBFAC,,, | Performed by: NURSE PRACTITIONER

## 2025-04-07 PROCEDURE — 84466 ASSAY OF TRANSFERRIN: CPT

## 2025-04-07 PROCEDURE — 1159F MED LIST DOCD IN RCRD: CPT | Mod: CPTII,S$GLB,, | Performed by: NURSE PRACTITIONER

## 2025-04-07 PROCEDURE — 3008F BODY MASS INDEX DOCD: CPT | Mod: CPTII,S$GLB,, | Performed by: NURSE PRACTITIONER

## 2025-04-07 PROCEDURE — 99395 PREV VISIT EST AGE 18-39: CPT | Mod: S$GLB,,, | Performed by: NURSE PRACTITIONER

## 2025-04-07 PROCEDURE — 3078F DIAST BP <80 MM HG: CPT | Mod: CPTII,S$GLB,, | Performed by: NURSE PRACTITIONER

## 2025-04-07 PROCEDURE — 82728 ASSAY OF FERRITIN: CPT

## 2025-04-07 PROCEDURE — 1160F RVW MEDS BY RX/DR IN RCRD: CPT | Mod: CPTII,S$GLB,, | Performed by: NURSE PRACTITIONER

## 2025-04-07 PROCEDURE — 36415 COLL VENOUS BLD VENIPUNCTURE: CPT | Mod: PN

## 2025-04-07 NOTE — PROGRESS NOTES
[facility] Note    Chief Complaint      Chief Complaint   Patient presents with    Annual Exam       History of Present Illness      Marly Peng is a 24 y.o. female who presents today for   Chief Complaint   Patient presents with    Annual Exam         History of Present Illness    CHIEF COMPLAINT:  Patient presents for an annual wellness visit and medication review.    HPI:  Patient reports no current health concerns or symptoms. She has an upcoming pap smear scheduled at the end of the month with her gynecologist. She engages in regular exercise, specifically home workouts including core exercises, sit-ups, and planks. She denies having any known health issues besides a cardiac murmur and previously elevated blood pressure.    MEDICAL HISTORY:  Patient has a history of cardiac murmur and elevated blood pressure. Patient's last menstrual period (LMP) was on March 28th. She has a Pap smear scheduled at the end of this month.      ROS:  General: -fever, -chills, -fatigue, -weight gain, -weight loss  Eyes: -vision changes, -redness, -discharge  ENT: -ear pain, -nasal congestion, -sore throat  Cardiovascular: -chest pain, -palpitations, -lower extremity edema  Respiratory: -cough, -shortness of breath  Gastrointestinal: -abdominal pain, -nausea, -vomiting, -diarrhea, -constipation, -blood in stool  Genitourinary: -dysuria, -hematuria, -frequency  Musculoskeletal: -joint pain, -muscle pain  Skin: -rash, -lesion  Neurological: -headache, -dizziness, -numbness, -tingling  Psychiatric: -anxiety, -depression, -sleep difficulty           Family History:  family history includes Diabetes in her maternal grandmother; Diabetes type II in her mother; Hypertension in her maternal grandmother and mother; No Known Problems in her brother, father, maternal grandfather, paternal grandfather, paternal grandmother, and sister.   Family history was reviewed with patient.     Medications:  Encounter  "Medications[1]    Allergies:  Review of patient's allergies indicates:   Allergen Reactions    Amoxil [amoxicillin] Hives    Chocolate flavor Hives       Health Maintenance:  Health Maintenance   Topic Date Due    Chlamydia Screening  Never done    Pap Smear  Never done    TETANUS VACCINE  04/07/2026 (Originally 2/18/2023)    COVID-19 Vaccine (2 - 2024-25 season) 04/07/2026 (Originally 9/1/2024)    RSV Vaccine (Age 60+ and Pregnant patients) (1 - 1-dose 75+ series) 05/30/2076    Hepatitis C Screening  Completed    HIV Screening  Completed    Hib Vaccines  Completed    Hepatitis B Vaccines  Completed    IPV Vaccines  Completed    Lipid Panel  Completed    Hepatitis A Vaccines  Completed    MMR Vaccines  Completed    Meningococcal Vaccine  Completed    Pneumococcal Vaccines (Age 0-49)  Aged Out    DTaP/Tdap/Td Vaccines  Discontinued    Influenza Vaccine  Discontinued    Varicella Vaccines  Discontinued    HPV Vaccines  Discontinued     Health Maintenance Topics with due status: Not Due       Topic Last Completion Date    RSV Vaccine (Age 60+ and Pregnant patients) Not Due       Physical Exam      Vital Signs  Pulse: 91  SpO2: 100 %  BP: 132/62  BP Location: Right arm  Patient Position: Sitting  Pain Score: 0-No pain  Height and Weight  Height: 5' 6" (167.6 cm)  Weight: 92.4 kg (203 lb 11.3 oz)  BSA (Calculated - sq m): 2.07 sq meters  BMI (Calculated): 32.9  Weight in (lb) to have BMI = 25: 154.6]    Physical Exam    General: No acute distress. Well-developed. Well-nourished.  Eyes: EOMI. Sclerae anicteric.  HENT: Normocephalic. Atraumatic. Nares patent. Moist oral mucosa. Left nostril is normal.  Ears: Bilateral TMs clear. Bilateral EACs clear.  Cardiovascular: Regular rate. Regular rhythm. No murmurs. No rubs. No gallops. Normal S1, S2.  Respiratory: Normal respiratory effort. Clear to auscultation bilaterally. No rales. No rhonchi. No wheezing. Lungs sound clear.  Musculoskeletal: No  obvious " deformity.  Extremities: No lower extremity edema.  Neurological: Alert & oriented x3. No slurred speech. Normal gait.  Psychiatric: Normal mood. Normal affect. Good insight. Good judgment.  Skin: Warm. Dry. No rash.            Assessment/Plan     Marly Peng is a 24 y.o.female with:    Assessment & Plan    Z00.0 Routine medical exam  R01.1 Cardiac murmur, unspecified  J30.89 Other allergic rhinitis      IMPRESSION:  - Reviewed vaccination status and offered various vaccines.  - Assessed seasonal allergy management.  - Reviewed family medical history.  - Noted history of cardiac murmur and previously elevated BP.    CARDIAC MURMUR:  - Auscultated the patient's heart and confirmed the presence of a known cardiac murmur.  - Assessed the murmur as asymptomatic and likely benign.  - No specific treatment is required at this time as the murmur is asymptomatic.  - Instructed the patient to report any new cardiac symptoms.    ALLERGIC RHINITIS:  - Educated the patient about the presence of oak pollen in the environment and its potential to cause allergic symptoms.  - Examined the patient's left nostril.  - Recommend cetirizine (OTC) to be taken daily for seasonal allergies.  - Advised the patient to purchase cetirizine from [facility].    Routine medical exam:  - CBC, CMP, Lipid panel, Hgb A1c, TSH and free T4 ordered.  - exam completed.  As above, continue current medications and maintain follow up with specialists.  Return to clinic as needed.    Greater than 50% of visit was spent face to face with patient.  All questions were answered to patient's satisfaction.          Karen L Spencer, NP-C [facility]      This note was generated with the assistance of ambient listening technology. Verbal consent was obtained by the patient and accompanying visitor(s) for the recording of patient appointment to facilitate this note. I attest to having reviewed and edited the generated note for accuracy, though some syntax or  spelling errors may persist. Please contact the author of this note for any clarification.                   [1]   Outpatient Encounter Medications as of 4/7/2025   Medication Sig Dispense Refill    [DISCONTINUED] cetirizine (ZYRTEC) 10 MG tablet Take 1 tablet (10 mg total) by mouth once daily. 30 tablet 0    [DISCONTINUED] docosanoL (ABREVA) 10 % Crea Apply 1 Application topically 2 (two) times a day. (Patient not taking: Reported on 4/7/2025) 2 g 0    [DISCONTINUED] fluticasone propionate (FLONASE) 50 mcg/actuation nasal spray 1 spray (50 mcg total) by Each Nostril route 2 (two) times daily as needed for Rhinitis. 15 g 0    [DISCONTINUED] hydroCHLOROthiazide (HYDRODIURIL) 12.5 MG Tab Take 1 tablet (12.5 mg total) by mouth once daily. 30 tablet 11    [DISCONTINUED] ibuprofen (ADVIL,MOTRIN) 600 MG tablet Take 1 tablet (600 mg total) by mouth every 6 (six) hours as needed for Pain. 20 tablet 0     No facility-administered encounter medications on file as of 4/7/2025.

## 2025-04-08 ENCOUNTER — RESULTS FOLLOW-UP (OUTPATIENT)
Dept: PRIMARY CARE CLINIC | Facility: CLINIC | Age: 25
End: 2025-04-08
Payer: COMMERCIAL

## 2025-04-08 DIAGNOSIS — D50.9 IRON DEFICIENCY ANEMIA, UNSPECIFIED IRON DEFICIENCY ANEMIA TYPE: Primary | ICD-10-CM

## 2025-04-08 RX ORDER — FERROUS SULFATE 325(65) MG
325 TABLET ORAL DAILY
Qty: 90 TABLET | Refills: 3 | Status: SHIPPED | OUTPATIENT
Start: 2025-04-08

## 2025-08-07 ENCOUNTER — PATIENT MESSAGE (OUTPATIENT)
Dept: PRIMARY CARE CLINIC | Facility: CLINIC | Age: 25
End: 2025-08-07
Payer: COMMERCIAL